# Patient Record
(demographics unavailable — no encounter records)

---

## 2025-03-31 NOTE — HISTORY OF PRESENT ILLNESS
[de-identified] : He says that several years ago he suffered from facial pain/pressure, memory loss and drainage from black mold in his building. He was treated homeopathically with ? chelators with some improvement. Chronic PND and sinus drip is ongoing. He's been using fluticasone daily for several years with no changes. He reports a negative sinus CT several years ago. No obvious allergies.  Lots of reflux that he doesn't treat. Constant globus and clearing.  KRYSTA on nightly CPAP.

## 2025-03-31 NOTE — PROCEDURE
[de-identified] : Indication: requirement for exam not possible via anterior rhinoscopy; chronic nasal obstruction After verbal consent and the administration of an aerosolized oxymetazoline/lidocaine mix, examination was performed with a flexible endoscope attached to a video monitoring system. Findings: Septum: undulating Mucosa: normal Polyposis: not present Inferior turbinates: large, pos Afrin test Middle and superior turbinates: normal Inferior meatus: unremarkable Middle meatus: unremarkable Superior meatus: unremarkable Speno-ethmoidal recess: unremarkable Nasopharynx: unremarkable Secretions: unremarkable Other findings: none [de-identified] : Indication: requirement for exam not possible via anterior examination; globus, clearing After verbal consent and the administration of an aerosolized oxymetazoline/lidocaine mix, examination was performed with a flexible endoscope placed through the nose which was attached to a video monitoring system. Findings: Nasopharynx: unremarkable Soft palate, lateral and posterior pharyngeal walls: unremarkable Base of tongue & lingual tonsil: minimal retrodisplacement Vallecula: unremarkable Epiglottis: unremarkable Piriform sinuses and pharyngoesophageal junction: unremarkable Arytenoids and AE folds: mild interarytenoid edema Ventricle and false vocal folds: unremarkable True vocal folds: Smooth free edge; surface without ectasias or edema; normal movement bilaterally with good apposition in phonation Visible subglottis: unremarkable Narrow-band imaging: not used Other findings: none

## 2025-03-31 NOTE — ASSESSMENT
[FreeTextEntry1] : Trial ipratropium; however, we discussed and scheduled Rhinaer, for which he is an excellent candidate.   We discussed the risks of untreated KRYSTA & strongly encouraged the pt to comply w/ CPAP.  We reviewed reflux precautions and the patient was provided with the corresponding educational handout.

## 2025-03-31 NOTE — PHYSICAL EXAM
[Nasal Endoscopy Performed] : nasal endoscopy was performed, see procedure section for findings [de-identified] :  Large inferior turbinates; positive Afrin test [Laryngoscopy Performed] : laryngoscopy was performed, see procedure section for findings [Normal] : no masses and lesions seen, face is symmetric